# Patient Record
Sex: FEMALE | Race: BLACK OR AFRICAN AMERICAN | NOT HISPANIC OR LATINO | URBAN - NONMETROPOLITAN AREA
[De-identification: names, ages, dates, MRNs, and addresses within clinical notes are randomized per-mention and may not be internally consistent; named-entity substitution may affect disease eponyms.]

---

## 2019-04-04 ENCOUNTER — IMPORTED ENCOUNTER (OUTPATIENT)
Dept: URBAN - NONMETROPOLITAN AREA CLINIC 1 | Facility: CLINIC | Age: 84
End: 2019-04-04

## 2019-04-04 PROBLEM — E11.9: Noted: 2019-04-04

## 2019-04-04 PROBLEM — Z96.1: Noted: 2019-04-04

## 2019-04-04 PROCEDURE — 92015 DETERMINE REFRACTIVE STATE: CPT

## 2019-04-04 PROCEDURE — 92014 COMPRE OPH EXAM EST PT 1/>: CPT

## 2019-04-04 NOTE — PATIENT DISCUSSION
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 -Explained the possible effects of poorly controlled diabetes and the damage that diabetes can cause to ocular health. -Patient to check HgbA1C.-Pt instructed to contact our office with any vision changes.

## 2020-08-10 ENCOUNTER — IMPORTED ENCOUNTER (OUTPATIENT)
Dept: URBAN - NONMETROPOLITAN AREA CLINIC 1 | Facility: CLINIC | Age: 85
End: 2020-08-10

## 2020-08-10 PROBLEM — H26.493: Noted: 2020-08-10

## 2020-08-10 PROBLEM — Z96.1: Noted: 2019-04-04

## 2020-08-10 PROBLEM — E11.9: Noted: 2019-04-04

## 2020-08-10 PROCEDURE — 92014 COMPRE OPH EXAM EST PT 1/>: CPT

## 2020-08-10 NOTE — PATIENT DISCUSSION
pseudophakia oumonitor for pcoDM s -Stressed the importance of keeping blood sugars under control blood pressure under control and weight normalization and regular visits with PCP. -Explained the possible effects of poorly controlled diabetes and the damage that diabetes can cause to ocular health. -Patient to check HgbA1C.-Pt instructed to contact our office with any vision changes. PCO OD>OS-Explained PCO and RBAs of YAG Capsulotomy to pt. -Pt elects to proceed.  REFER FOR CONSULT

## 2020-09-10 ENCOUNTER — IMPORTED ENCOUNTER (OUTPATIENT)
Dept: URBAN - NONMETROPOLITAN AREA CLINIC 1 | Facility: CLINIC | Age: 85
End: 2020-09-10

## 2020-09-10 PROBLEM — H26.493: Noted: 2020-09-10

## 2020-09-10 PROBLEM — E11.9: Noted: 2020-09-10

## 2020-09-10 PROBLEM — Z01.818: Noted: 2020-09-10

## 2020-09-10 PROBLEM — Z96.1: Noted: 2020-09-10

## 2020-09-10 PROCEDURE — 92014 COMPRE OPH EXAM EST PT 1/>: CPT

## 2020-09-10 NOTE — PATIENT DISCUSSION
PCO-Explained PCO and RBAs of YAG Capsulotomy to pt. -Pt elects to proceed. YAG Caps OD first than and YAG Caps OS in 1-2 weeks.-Discussed that we are now doing laser at [facility]. Schedule for Monday.

## 2020-09-11 ENCOUNTER — IMPORTED ENCOUNTER (OUTPATIENT)
Dept: URBAN - NONMETROPOLITAN AREA CLINIC 1 | Facility: CLINIC | Age: 85
End: 2020-09-11

## 2020-09-11 PROBLEM — Z01.818: Noted: 2020-09-11

## 2020-09-11 PROBLEM — Z96.1: Noted: 2020-09-11

## 2020-09-11 PROBLEM — H26.493: Noted: 2020-09-11

## 2020-09-11 PROBLEM — E11.9: Noted: 2020-09-11

## 2020-09-18 PROBLEM — H26.493: Noted: 2020-09-18

## 2020-09-18 PROBLEM — Z96.1: Noted: 2020-09-18

## 2020-09-18 PROBLEM — E11.9: Noted: 2020-09-18

## 2020-09-18 PROBLEM — Z01.818: Noted: 2020-09-18

## 2020-10-15 ENCOUNTER — IMPORTED ENCOUNTER (OUTPATIENT)
Dept: URBAN - NONMETROPOLITAN AREA CLINIC 1 | Facility: CLINIC | Age: 85
End: 2020-10-15

## 2020-10-26 ENCOUNTER — IMPORTED ENCOUNTER (OUTPATIENT)
Dept: URBAN - NONMETROPOLITAN AREA CLINIC 1 | Facility: CLINIC | Age: 85
End: 2020-10-26

## 2020-10-26 PROCEDURE — 99024 POSTOP FOLLOW-UP VISIT: CPT

## 2020-10-28 ENCOUNTER — IMPORTED ENCOUNTER (OUTPATIENT)
Dept: URBAN - NONMETROPOLITAN AREA CLINIC 1 | Facility: CLINIC | Age: 85
End: 2020-10-28

## 2020-10-28 PROBLEM — Z96.1: Noted: 2020-10-28

## 2020-10-28 PROBLEM — E11.9: Noted: 2020-10-28

## 2020-10-28 PROBLEM — Z01.818: Noted: 2020-10-28

## 2020-10-28 PROBLEM — H26.493: Noted: 2020-10-28

## 2022-04-09 ASSESSMENT — TONOMETRY
OD_IOP_MMHG: 14
OD_IOP_MMHG: 14
OD_IOP_MMHG: 15
OS_IOP_MMHG: 15
OS_IOP_MMHG: 15
OD_IOP_MMHG: 15
OS_IOP_MMHG: 14

## 2022-04-09 ASSESSMENT — VISUAL ACUITY
OD_CC: 20/40
OS_SC: 20/20
OS_SC: 20/30-2
OD_SC: 20/60
OD_PH: 20/50
OD_SC: 20/70
OU_CC: 20/30
OD_GLARE: 20/400
OS_GLARE: 20/60
OS_CC: 20/30
OD_SC: 20/50

## 2022-12-20 ENCOUNTER — COMPREHENSIVE EXAM (OUTPATIENT)
Dept: RURAL CLINIC 1 | Facility: CLINIC | Age: 87
End: 2022-12-20

## 2022-12-20 PROCEDURE — 92014 COMPRE OPH EXAM EST PT 1/>: CPT

## 2022-12-20 PROCEDURE — 92015 DETERMINE REFRACTIVE STATE: CPT

## 2022-12-20 ASSESSMENT — TONOMETRY
OS_IOP_MMHG: 15
OD_IOP_MMHG: 14

## 2022-12-20 ASSESSMENT — VISUAL ACUITY
OS_BAT: 20/40
OD_CC: 20/30-1
OS_CC: 20/25
OD_BAT: 20/40